# Patient Record
Sex: FEMALE | Race: WHITE | NOT HISPANIC OR LATINO | URBAN - METROPOLITAN AREA
[De-identification: names, ages, dates, MRNs, and addresses within clinical notes are randomized per-mention and may not be internally consistent; named-entity substitution may affect disease eponyms.]

---

## 2018-02-20 ENCOUNTER — EMERGENCY (EMERGENCY)
Facility: HOSPITAL | Age: 28
LOS: 0 days | Discharge: HOME | End: 2018-02-20
Attending: EMERGENCY MEDICINE | Admitting: INTERNAL MEDICINE

## 2018-02-20 VITALS
HEART RATE: 85 BPM | HEIGHT: 63 IN | SYSTOLIC BLOOD PRESSURE: 133 MMHG | DIASTOLIC BLOOD PRESSURE: 70 MMHG | RESPIRATION RATE: 18 BRPM | TEMPERATURE: 99 F | OXYGEN SATURATION: 98 % | WEIGHT: 130.07 LBS

## 2018-02-20 DIAGNOSIS — S92.535A NONDISPLACED FRACTURE OF DISTAL PHALANX OF LEFT LESSER TOE(S), INITIAL ENCOUNTER FOR CLOSED FRACTURE: ICD-10-CM
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
## 2018-02-20 DIAGNOSIS — S90.122A CONTUSION OF LEFT LESSER TOE(S) WITHOUT DAMAGE TO NAIL, INITIAL ENCOUNTER: ICD-10-CM
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
## 2018-02-20 DIAGNOSIS — S90.222A CONTUSION OF LEFT LESSER TOE(S) WITH DAMAGE TO NAIL, INITIAL ENCOUNTER: ICD-10-CM
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
## 2018-02-20 DIAGNOSIS — F17.210 NICOTINE DEPENDENCE, CIGARETTES, UNCOMPLICATED: ICD-10-CM

## 2018-02-20 RX ORDER — IBUPROFEN 200 MG
800 TABLET ORAL ONCE
Qty: 0 | Refills: 0 | Status: COMPLETED | OUTPATIENT
Start: 2018-02-20 | End: 2018-02-20

## 2018-02-20 RX ADMIN — Medication 800 MILLIGRAM(S): at 21:05

## 2018-02-20 RX ADMIN — Medication 800 MILLIGRAM(S): at 21:12

## 2018-02-20 NOTE — ED PROVIDER NOTE - NS ED ROS FT
Review of Systems    Constitutional: (-) fever  Cardiovascular: (-) syncope  Integumentary: (-) rash  Neurological: (-) altered mental status

## 2018-02-20 NOTE — ED PROVIDER NOTE - OBJECTIVE STATEMENT
26 y/o female s/p dropped 45 lb weight on left foot at 4 pm today. +throbbing and bruising to left 5th toe and dorsal foot/ no other injuries

## 2018-02-20 NOTE — ED PROVIDER NOTE - ATTENDING CONTRIBUTION TO CARE
Pt is a 28yo female who dropped a 45lb weigh tonto L 5th toe.  The toenail is black and the toe is bruised and painful.    Exam: normal sensation, limited ROM of toe due to pain, nailbed hematoma, tenderness along pinky toe  Imp: r/o fx, subungal hematoma  Plan: imaging, trephination

## 2018-02-20 NOTE — ED PROVIDER NOTE - PHYSICAL EXAMINATION
left foot +dorsal lateral foot +swelling brusiing and tenderness/ +subungual hematoma to left 5th toe/ decreased rom of left 5th toe and swelling/ no bony tendferness to left ankle

## 2018-02-20 NOTE — ED PROVIDER NOTE - PRINCIPAL DIAGNOSIS
Closed nondisplaced fracture of phalanx of lesser toe of left foot, unspecified phalanx, initial encounter

## 2018-02-20 NOTE — ED PROVIDER NOTE - CARE PLAN
Principal Discharge DX:	Closed nondisplaced fracture of phalanx of lesser toe of left foot, unspecified phalanx, initial encounter  Secondary Diagnosis:	Subungual hematoma of fifth toe of left foot, initial encounter

## 2018-02-22 ENCOUNTER — EMERGENCY (EMERGENCY)
Facility: HOSPITAL | Age: 28
LOS: 0 days | Discharge: HOME | End: 2018-02-22
Attending: EMERGENCY MEDICINE

## 2018-02-22 VITALS
WEIGHT: 130.07 LBS | SYSTOLIC BLOOD PRESSURE: 126 MMHG | DIASTOLIC BLOOD PRESSURE: 77 MMHG | RESPIRATION RATE: 18 BRPM | HEART RATE: 85 BPM | TEMPERATURE: 97 F | HEIGHT: 63 IN | OXYGEN SATURATION: 97 %
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
## 2018-02-22 DIAGNOSIS — M79.675 PAIN IN LEFT TOE(S): ICD-10-CM

## 2018-02-22 DIAGNOSIS — S90.122A CONTUSION OF LEFT LESSER TOE(S) WITHOUT DAMAGE TO NAIL, INITIAL ENCOUNTER: ICD-10-CM
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
## 2018-02-22 RX ORDER — IBUPROFEN 200 MG
600 TABLET ORAL ONCE
Qty: 0 | Refills: 0 | Status: COMPLETED | OUTPATIENT
Start: 2018-02-22 | End: 2018-02-22

## 2018-02-22 RX ADMIN — Medication 600 MILLIGRAM(S): at 22:41

## 2018-02-22 NOTE — ED PROVIDER NOTE - NS ED ROS FT
Pt denied fvr/chills, HA, visual changes, dizziness, light headedness, presyncope, LOC, CP, HENDRIX, PND, SOB, cough, hemoptysis, abd pain, n/v/d, changes in bowel or bladder habits, LE edema, numbness, weakness, changes in gait.  The pt also denied recent travel outside of the country, recent illnesses, sick contacts, recent airplane trips or periods of immobility/bedbound.

## 2018-02-22 NOTE — ED PROVIDER NOTE - OBJECTIVE STATEMENT
28 yo F hx of toe fracture 2 days ago after dropping weight, john taped and with darco shoe p/w increasing pain to foot without interval trauma, no numbness or weakness. no other complaints.

## 2018-02-22 NOTE — ED PROVIDER NOTE - MEDICAL DECISION MAKING DETAILS
I have personally performed a history and physical exam on this patient and personally directed the management of the patient. Patient has known history of left 5th toe f racture after dropping a weight on it.  She was john taped and placed in a post op shoe.  she presents with worsening pain.  I obtained an repeat xray.  I will discharge she has podiatry follow up.  we discussed indications to return at this time.

## 2019-04-03 ENCOUNTER — TRANSCRIPTION ENCOUNTER (OUTPATIENT)
Age: 29
End: 2019-04-03

## 2019-11-27 ENCOUNTER — EMERGENCY (EMERGENCY)
Facility: HOSPITAL | Age: 29
LOS: 0 days | Discharge: HOME | End: 2019-11-28

## 2019-11-27 VITALS
OXYGEN SATURATION: 98 % | DIASTOLIC BLOOD PRESSURE: 78 MMHG | RESPIRATION RATE: 19 BRPM | WEIGHT: 130.07 LBS | HEART RATE: 70 BPM | SYSTOLIC BLOOD PRESSURE: 111 MMHG | TEMPERATURE: 98 F

## 2019-11-28 ENCOUNTER — EMERGENCY (EMERGENCY)
Facility: HOSPITAL | Age: 29
LOS: 0 days | Discharge: HOME | End: 2019-11-28
Admitting: EMERGENCY MEDICINE
Payer: OTHER MISCELLANEOUS

## 2019-11-28 VITALS
DIASTOLIC BLOOD PRESSURE: 78 MMHG | HEART RATE: 98 BPM | RESPIRATION RATE: 18 BRPM | TEMPERATURE: 97 F | OXYGEN SATURATION: 98 % | SYSTOLIC BLOOD PRESSURE: 120 MMHG

## 2019-11-28 DIAGNOSIS — F17.200 NICOTINE DEPENDENCE, UNSPECIFIED, UNCOMPLICATED: ICD-10-CM

## 2019-11-28 DIAGNOSIS — Z77.21 CONTACT WITH AND (SUSPECTED) EXPOSURE TO POTENTIALLY HAZARDOUS BODY FLUIDS: ICD-10-CM
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
## 2019-11-28 DIAGNOSIS — Z23 ENCOUNTER FOR IMMUNIZATION: ICD-10-CM
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
## 2019-11-28 LAB
ALBUMIN SERPL ELPH-MCNC: 4.6 G/DL — SIGNIFICANT CHANGE UP (ref 3.5–5.2)
ALP SERPL-CCNC: 61 U/L — SIGNIFICANT CHANGE UP (ref 30–115)
ALT FLD-CCNC: 18 U/L — SIGNIFICANT CHANGE UP (ref 0–41)
ANION GAP SERPL CALC-SCNC: 13 MMOL/L — SIGNIFICANT CHANGE UP (ref 7–14)
AST SERPL-CCNC: 19 U/L — SIGNIFICANT CHANGE UP (ref 0–41)
BASOPHILS # BLD AUTO: 0.04 K/UL — SIGNIFICANT CHANGE UP (ref 0–0.2)
BASOPHILS NFR BLD AUTO: 0.5 % — SIGNIFICANT CHANGE UP (ref 0–1)
BILIRUB SERPL-MCNC: <0.2 MG/DL — SIGNIFICANT CHANGE UP (ref 0.2–1.2)
BUN SERPL-MCNC: 19 MG/DL — SIGNIFICANT CHANGE UP (ref 10–20)
CALCIUM SERPL-MCNC: 9.2 MG/DL — SIGNIFICANT CHANGE UP (ref 8.5–10.1)
CHLORIDE SERPL-SCNC: 103 MMOL/L — SIGNIFICANT CHANGE UP (ref 98–110)
CO2 SERPL-SCNC: 21 MMOL/L — SIGNIFICANT CHANGE UP (ref 17–32)
CREAT SERPL-MCNC: 0.7 MG/DL — SIGNIFICANT CHANGE UP (ref 0.7–1.5)
EOSINOPHIL # BLD AUTO: 0.14 K/UL — SIGNIFICANT CHANGE UP (ref 0–0.7)
EOSINOPHIL NFR BLD AUTO: 1.6 % — SIGNIFICANT CHANGE UP (ref 0–8)
GLUCOSE SERPL-MCNC: 98 MG/DL — SIGNIFICANT CHANGE UP (ref 70–99)
HCT VFR BLD CALC: 41.4 % — SIGNIFICANT CHANGE UP (ref 37–47)
HGB BLD-MCNC: 13.8 G/DL — SIGNIFICANT CHANGE UP (ref 12–16)
IMM GRANULOCYTES NFR BLD AUTO: 0.2 % — SIGNIFICANT CHANGE UP (ref 0.1–0.3)
LYMPHOCYTES # BLD AUTO: 2.13 K/UL — SIGNIFICANT CHANGE UP (ref 1.2–3.4)
LYMPHOCYTES # BLD AUTO: 24 % — SIGNIFICANT CHANGE UP (ref 20.5–51.1)
MCHC RBC-ENTMCNC: 31 PG — SIGNIFICANT CHANGE UP (ref 27–31)
MCHC RBC-ENTMCNC: 33.3 G/DL — SIGNIFICANT CHANGE UP (ref 32–37)
MCV RBC AUTO: 93 FL — SIGNIFICANT CHANGE UP (ref 81–99)
MONOCYTES # BLD AUTO: 0.59 K/UL — SIGNIFICANT CHANGE UP (ref 0.1–0.6)
MONOCYTES NFR BLD AUTO: 6.7 % — SIGNIFICANT CHANGE UP (ref 1.7–9.3)
NEUTROPHILS # BLD AUTO: 5.95 K/UL — SIGNIFICANT CHANGE UP (ref 1.4–6.5)
NEUTROPHILS NFR BLD AUTO: 67 % — SIGNIFICANT CHANGE UP (ref 42.2–75.2)
NRBC # BLD: 0 /100 WBCS — SIGNIFICANT CHANGE UP (ref 0–0)
PLATELET # BLD AUTO: 237 K/UL — SIGNIFICANT CHANGE UP (ref 130–400)
POTASSIUM SERPL-MCNC: 4.3 MMOL/L — SIGNIFICANT CHANGE UP (ref 3.5–5)
POTASSIUM SERPL-SCNC: 4.3 MMOL/L — SIGNIFICANT CHANGE UP (ref 3.5–5)
PROT SERPL-MCNC: 7.3 G/DL — SIGNIFICANT CHANGE UP (ref 6–8)
RBC # BLD: 4.45 M/UL — SIGNIFICANT CHANGE UP (ref 4.2–5.4)
RBC # FLD: 12.4 % — SIGNIFICANT CHANGE UP (ref 11.5–14.5)
SODIUM SERPL-SCNC: 137 MMOL/L — SIGNIFICANT CHANGE UP (ref 135–146)
WBC # BLD: 8.87 K/UL — SIGNIFICANT CHANGE UP (ref 4.8–10.8)
WBC # FLD AUTO: 8.87 K/UL — SIGNIFICANT CHANGE UP (ref 4.8–10.8)

## 2019-11-28 PROCEDURE — 99284 EMERGENCY DEPT VISIT MOD MDM: CPT

## 2019-11-28 RX ORDER — RALTEGRAVIR 400 MG/1
1 TABLET, FILM COATED ORAL
Qty: 42 | Refills: 0
Start: 2019-11-28 | End: 2019-12-18

## 2019-11-28 RX ORDER — EMTRICITABINE AND TENOFOVIR DISOPROXIL FUMARATE 200; 300 MG/1; MG/1
1 TABLET, FILM COATED ORAL
Qty: 21 | Refills: 0
Start: 2019-11-28 | End: 2019-12-18

## 2019-11-28 RX ORDER — TETANUS TOXOID, REDUCED DIPHTHERIA TOXOID AND ACELLULAR PERTUSSIS VACCINE, ADSORBED 5; 2.5; 8; 8; 2.5 [IU]/.5ML; [IU]/.5ML; UG/.5ML; UG/.5ML; UG/.5ML
0.5 SUSPENSION INTRAMUSCULAR ONCE
Refills: 0 | Status: COMPLETED | OUTPATIENT
Start: 2019-11-28 | End: 2019-11-28

## 2019-11-28 RX ORDER — RALTEGRAVIR 400 MG/1
400 TABLET, FILM COATED ORAL ONCE
Refills: 0 | Status: COMPLETED | OUTPATIENT
Start: 2019-11-28 | End: 2019-11-28

## 2019-11-28 RX ORDER — EMTRICITABINE AND TENOFOVIR DISOPROXIL FUMARATE 200; 300 MG/1; MG/1
1 TABLET, FILM COATED ORAL ONCE
Refills: 0 | Status: COMPLETED | OUTPATIENT
Start: 2019-11-28 | End: 2019-11-28

## 2019-11-28 RX ADMIN — TETANUS TOXOID, REDUCED DIPHTHERIA TOXOID AND ACELLULAR PERTUSSIS VACCINE, ADSORBED 0.5 MILLILITER(S): 5; 2.5; 8; 8; 2.5 SUSPENSION INTRAMUSCULAR at 14:06

## 2019-11-28 RX ADMIN — RALTEGRAVIR 400 MILLIGRAM(S): 400 TABLET, FILM COATED ORAL at 14:08

## 2019-11-28 RX ADMIN — EMTRICITABINE AND TENOFOVIR DISOPROXIL FUMARATE 1 TABLET(S): 200; 300 TABLET, FILM COATED ORAL at 14:08

## 2019-11-28 NOTE — ED PROVIDER NOTE - OBJECTIVE STATEMENT
29 y.o. female without any PMH presented to the ER c/o needlestick injury at 8 PM last night.  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  Pt sure of source pt's complete PMH.  Source pt being tested today.  Pt unsure of her tetanus status but her Hep B series complete and she has no known h/o Hep C.  Pt requesting PEP.

## 2019-11-28 NOTE — ED PROVIDER NOTE - NSFOLLOWUPINSTRUCTIONS_ED_ALL_ED_FT
Needlestick and Sharps Injury  A needlestick injury happens when a person gets poked (stuck) by a needle or sharp tool (sharps) that may have someone else's blood on it. A needlestick injury can happen to a health care worker, or to anyone who is exposed to needles. The injury may expose you to blood that carries infections such as:  Hepatitis B.Hepatitis C.Human immunodeficiency virus (HIV).If you have a needle stick injury or think you may have been exposed to blood or body fluids:  Wash the injured area right away with soap and water.Place a bandage or clean towel on the wound and apply gentle pressure to stop the bleeding. Do not squeeze or rub the area.Notify a work place supervisor or doctor. Follow any procedures in your work place.Tell your doctor if you are pregnant or breastfeeding.Treatments may include:  Blood tests to make sure that you have no infection.Tetanus shot.Hepatitis B shot.Medicines to stop or treat infection.Treatment for the wound.Follow these instructions at home:  Wound care     There are many ways to close and cover a wound. For example, a wound can be covered with sutures, skin glue, or adhesive strips. Follow instructions from your doctor about:  How to take care of your wound.When and how you should change your bandage (dressing).Keep the bandage dry as told by your doctor. Do not take baths, swim, use a hot tub, or do anything that would put your wound underwater until your doctor approves.Check your wound every day for signs of infection. Check for:  Redness, swelling, or pain.Fluid or blood.Pus or a bad smell.Warmth.General instructions     Take over-the-counter and prescription medicines only as told by your doctor.If you were prescribed an antibiotic medicine, take it as told by your doctor. Do not stop using the antibiotic even if you start to feel better.Keep all follow-up visits as told by your doctor. This is important.Contact a doctor if:  The poked area is red, swollen, or painful.You have a fever.You feel worried (anxious), mad, or sad (depressed).You have trouble sleeping.Your skin or the whites of your eyes look yellow (jaundice).You have belly pain or a feeling of fullness.You have tiredness (fatigue).You feel sickness in a lot of your body (malaise).You get infections often.Summary  A needlestick injury happens when a person gets poked (stuck) by a needle that may have someone else's blood on it.It is treated by cleaning the injured area right away with soap and water. You may get tetanus and hepatitis B shots. You may also get medicines for infections.Take medicine and care for your wound as told by your doctor.This information is not intended to replace advice given to you by your health care provider. Make sure you discuss any questions you have with your health care provider.    Document Released: 01/20/2012 Document Revised: 01/30/2019 Document Reviewed: 01/30/2019  [employer] Patient Education © 2019 [employer]

## 2019-11-28 NOTE — ED PROVIDER NOTE - SKIN, MLM
(+) healed over puncture wound noted palmar distal tip Left index finger; FROM, no motor or sensory deficit, cap refill < 2 sec

## 2019-11-28 NOTE — ED ADULT NURSE NOTE - OBJECTIVE STATEMENT
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

## 2019-11-28 NOTE — ED PROVIDER NOTE - CLINICAL SUMMARY MEDICAL DECISION MAKING FREE TEXT BOX
tdap; PEP; baseline testing; ID referral; pt will follow up with PCP in 2-3 days; any new or worsening symptoms, pt will return to ER.

## 2019-11-28 NOTE — ED ADULT NURSE NOTE - CHIEF COMPLAINT QUOTE
"I stuck myself with a needle yesterday with a butterfly needle. I work at [employer]"

## 2019-11-28 NOTE — ED ADULT NURSE NOTE - CHPI ED NUR SYMPTOMS NEG
no dizziness/no fever/no chills/no decreased eating/drinking/no pain/no vomiting/no nausea/no tingling/no weakness

## 2019-11-28 NOTE — ED PROVIDER NOTE - CARE PROVIDER_API CALL
Kenton Drake)  Infectious Disease; Internal Medicine  [address] [place] [address]  Phone: [phone]  Fax: [phone]  Follow Up Time: 1-3 Days

## 2019-11-28 NOTE — ED ADULT NURSE NOTE - OBJECTIVE STATEMENT
Patient c/o needle stick left hand 2nd digit last night at work. Patient denies any pain or discomfort to area. No redness or swelling noted to area.

## 2019-11-28 NOTE — ED PROVIDER NOTE - PATIENT PORTAL LINK FT
You can access the FollowMyHealth Patient Portal offered by [facility] by registering at the following website: [email or web address]. By joining [employer]’s FollowMyHealth portal, you will also be able to view your health information using other applications (apps) compatible with our system.

## 2019-11-29 LAB
HAV IGM SER-ACNC: SIGNIFICANT CHANGE UP
HBV CORE IGM SER-ACNC: SIGNIFICANT CHANGE UP
HBV SURFACE AG SER-ACNC: SIGNIFICANT CHANGE UP
HCV AB S/CO SERPL IA: 0.15 S/CO — SIGNIFICANT CHANGE UP (ref 0–0.99)
HCV AB SERPL-IMP: SIGNIFICANT CHANGE UP
HIV 1+2 AB+HIV1 P24 AG SERPL QL IA: SIGNIFICANT CHANGE UP

## 2019-12-10 ENCOUNTER — EMERGENCY (EMERGENCY)
Facility: HOSPITAL | Age: 29
LOS: 0 days | Discharge: HOME | End: 2019-12-10
Attending: EMERGENCY MEDICINE | Admitting: EMERGENCY MEDICINE
Payer: COMMERCIAL

## 2019-12-10 VITALS
WEIGHT: 130.07 LBS | OXYGEN SATURATION: 100 % | SYSTOLIC BLOOD PRESSURE: 123 MMHG | TEMPERATURE: 99 F | HEART RATE: 117 BPM | RESPIRATION RATE: 18 BRPM | DIASTOLIC BLOOD PRESSURE: 71 MMHG

## 2019-12-10 VITALS — TEMPERATURE: 99 F | HEART RATE: 99 BPM

## 2019-12-10 DIAGNOSIS — R50.9 FEVER, UNSPECIFIED: ICD-10-CM

## 2019-12-10 DIAGNOSIS — Z79.899 OTHER LONG TERM (CURRENT) DRUG THERAPY: ICD-10-CM

## 2019-12-10 DIAGNOSIS — J02.9 ACUTE PHARYNGITIS, UNSPECIFIED: ICD-10-CM

## 2019-12-10 DIAGNOSIS — Z79.1 LONG TERM (CURRENT) USE OF NON-STEROIDAL ANTI-INFLAMMATORIES (NSAID): ICD-10-CM

## 2019-12-10 PROCEDURE — 99283 EMERGENCY DEPT VISIT LOW MDM: CPT

## 2019-12-10 RX ORDER — DEXAMETHASONE 0.5 MG/5ML
10 ELIXIR ORAL ONCE
Refills: 0 | Status: COMPLETED | OUTPATIENT
Start: 2019-12-10 | End: 2019-12-10

## 2019-12-10 RX ADMIN — Medication 10 MILLIGRAM(S): at 03:23

## 2019-12-10 NOTE — ED PROVIDER NOTE - CLINICAL SUMMARY MEDICAL DECISION MAKING FREE TEXT BOX
Pt w exudative pharyngitis. Abx ordered for 7 days. Encouraged PMD f/uy. Full DC instructions discussed and patient knows when to seek immediate medical attention. Patient has proper follow-up. All results discussed with the patient they may require further work-up. Limitations of ED work-up discussed. All  questions and concerns from patient or family addressed. Understanding of insturctions verbalized

## 2019-12-10 NOTE — ED PROVIDER NOTE - NS ED ROS FT
Review of Systems    Constitutional: (-) fever   Eyes/ENT: (-) vision changes  Cardiovascular: (-) chest pain, (-) syncope (-) palpitations  Respiratory: (-) cough, (-) shortness of breath  Gastrointestinal: (-) vomiting (-) abd pain   Integumentary: (-) rash, (-) edema  Neurological: (-) headache  Hematologic: (-) easy bruising   Allergic/Immunologic: (-) pruritus

## 2019-12-10 NOTE — ED PROVIDER NOTE - ATTENDING CONTRIBUTION TO CARE
I personally evaluated the patient. I reviewed the Resident’s or Physician Assistant’s note (as assigned above), and agree with the findings and plan except as documented in my note.    30 y/o F with no PMH presents with 2 days of sore throat. No neck stifness. Fever today. No n/v. No rash.     CONSTITUTIONAL: Well-developed; well-nourished; in no acute distress. Sitting up and providing appropriate history and physical examination  SKIN: skin exam is warm and dry, no acute rash.  HEAD: Normocephalic; atraumatic.  EYES: PERRL, 3 mm bilateral, no nystagmus, EOM intact; conjunctiva and sclera clear.  ENT: + b/l tonsillar exudates. Uvula midline. No PTA. Tnghue w no lesions or elevation  NECK: Supple; non tender.+ full passive ROM in all directions. No JVD  CARD: S1, S2 normal; no murmurs, gallops, or rubs. Regular rate and rhythm. + Symmetric Strong Pulses  RESP: No wheezes, rales or rhonchi. Good air movement bilaterally  ABD: soft; non-distended; non-tender. No Rebound, No Gaurding, No signs of peritnitis, No CVA tenderness    Plan- decadron, augmentin, pmd/ fu

## 2019-12-10 NOTE — ED PROVIDER NOTE - OBJECTIVE STATEMENT
30 y/o F without PMH presents with constant burning non-radiating moderate sore throat, fever tmax 101.2F, minimal benefit with advil cold and sinus.   no palliating/provoking factors.   no rnny nose/cough.

## 2019-12-10 NOTE — ED PROVIDER NOTE - PROGRESS NOTE DETAILS
modified centor criteria 4. will treat as strep. Reviewed all results and necessity for follow up. Counseled on red flags and to return for them.  Patient appears well on discharge.

## 2019-12-10 NOTE — ED PROVIDER NOTE - NSDCPRINTRESULTS_ED_ALL_ED
Consult received for LTAC placement.  CM met at bedside with patient.  He refused LTAC placement.  He is agreeable to home health and IV antibiotics at home.  He does not have a preference for home health or IV infusion companies.  Choice form completed and placed in patient blue folder.     Patient requests all Lab and Radiology Results on their Discharge Instructions

## 2019-12-10 NOTE — ED ADULT NURSE NOTE - NSIMPLEMENTINTERV_GEN_ALL_ED
Implemented All Universal Safety Interventions:  [place] to call system. Call bell, personal items and telephone within reach. Instruct patient to call for assistance. Room bathroom lighting operational. Non-slip footwear when patient is off stretcher. Physically safe environment: no spills, clutter or unnecessary equipment. Stretcher in lowest position, wheels locked, appropriate side rails in place.

## 2019-12-10 NOTE — ED PROVIDER NOTE - PHYSICAL EXAMINATION
PHYSICAL EXAM:    GENERAL: Alert, appears stated age, well appearing, non-toxic  SKIN: Warm, pink and dry. MMM.   EYE: Normal lids/conjunctiva  ENT: Normal hearing, patent oropharynx with tonsilar exudate. +b/l anterior cervical LAD.   NECK: +supple. No meningismus, or JVD  Pulm: Bilateral BS, normal resp effort, no wheezes, stridor, or retractions  CV: RRR, no M/R/G, 2+and = radial pulses  Abd: soft, non-tender, non-distended  Mskel: no erythema, cyanosis, edema. no calf tenderness  Neuro: AAOx3. normal gait.

## 2021-01-24 ENCOUNTER — EMERGENCY (EMERGENCY)
Facility: HOSPITAL | Age: 31
LOS: 0 days | Discharge: HOME | End: 2021-01-24
Attending: STUDENT IN AN ORGANIZED HEALTH CARE EDUCATION/TRAINING PROGRAM | Admitting: STUDENT IN AN ORGANIZED HEALTH CARE EDUCATION/TRAINING PROGRAM
Payer: COMMERCIAL

## 2021-01-24 VITALS
HEIGHT: 63 IN | SYSTOLIC BLOOD PRESSURE: 121 MMHG | RESPIRATION RATE: 18 BRPM | TEMPERATURE: 98 F | HEART RATE: 78 BPM | OXYGEN SATURATION: 98 % | DIASTOLIC BLOOD PRESSURE: 61 MMHG

## 2021-01-24 DIAGNOSIS — M25.561 PAIN IN RIGHT KNEE: ICD-10-CM
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
## 2021-01-24 DIAGNOSIS — M79.661 PAIN IN RIGHT LOWER LEG: ICD-10-CM
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
## 2021-01-24 PROCEDURE — 93970 EXTREMITY STUDY: CPT | Mod: 26

## 2021-01-24 PROCEDURE — 99285 EMERGENCY DEPT VISIT HI MDM: CPT

## 2021-01-24 PROCEDURE — 73562 X-RAY EXAM OF KNEE 3: CPT | Mod: 26,RT

## 2021-01-24 NOTE — ED PROVIDER NOTE - NSFOLLOWUPINSTRUCTIONS_ED_ALL_ED_FT
Please follow up with your primary care physician within 24-72 hours and return immediately if symptoms worsen.    Knee Pain    WHAT YOU NEED TO KNOW:    What do I need to know about knee pain? Knee pain may start suddenly, or it may be a long-term problem. You may have pain on the side, front, or back of your knee. You may have knee stiffness and swelling. You may hear popping sounds or feel like your knee is giving way or locking up as you walk. You may feel pain when you sit, stand, walk, or climb up and down stairs.    What increases my risk for knee pain?     Obesity      A strain or tear in ligaments or tendons      A leg fracture or knee dislocation      Overuse of your knee      Osteoarthritis, rheumatoid arthritis, or gout      An infection, tumor, or cyst in your knee      Shoes that are not supportive, or training on a hard surface      Sports that involve jumping or pivoting on your knee    How is the cause of knee pain diagnosed? Your healthcare provider will examine your knee and ask about your symptoms. Tell your provider when the pain started and what you were doing at the time. Describe the pain, such as sharp, throbbing, or achy. Tell your provider about any knee injury or surgery you had. You may need any of the following:     MRI, CT, or ultrasound pictures may show an injury, fracture, or tumor.       Blood tests may be used to check the level of inflammation in your blood. The tests may also show signs of infection.      Arthroscopy is a procedure to look inside your knee joint with an arthroscope. An arthroscope is a flexible tube with a light and camera on the end. A knee arthroscopy is usually done to check for disease or damage inside your knee. These problems may be fixed during the procedure.    How is knee pain treated? Treatment will depend on the cause of your pain. You may need any of the following:     NSAIDs help decrease swelling and pain or fever. This medicine is available with or without a doctor's order. NSAIDs can cause stomach bleeding or kidney problems in certain people. If you take blood thinner medicine, always ask your healthcare provider if NSAIDs are safe for you. Always read the medicine label and follow directions.      Acetaminophen decreases pain and fever. It is available without a doctor's order. Ask how much to take and how often to take it. Follow directions. Read the labels of all other medicines you are using to see if they also contain acetaminophen, or ask your doctor or pharmacist. Acetaminophen can cause liver damage if not taken correctly. Do not use more than 4 grams (4,000 milligrams) total of acetaminophen in one day.       Prescription pain medicine may be given. Ask your healthcare provider how to take this medicine safely. Some prescription pain medicines contain acetaminophen. Do not take other medicines that contain acetaminophen without talking to your healthcare provider. Too much acetaminophen may cause liver damage. Prescription pain medicine may cause constipation. Ask your healthcare provider how to prevent or treat constipation.       Steroid injections may be given into your knee. Steroids reduce inflammation and pain.      Surgery may be used for some injuries, such as to repair a torn ACL.    What can I do to manage my symptoms?     Rest your knee so it can heal. Limit activities that increase your pain. Do low-impact exercises, such as walking or swimming.       Apply ice to help reduce swelling and pain. Use an ice pack, or put crushed ice in a plastic bag. Cover it with a towel before you apply it to your knee. Apply ice for 15 to 20 minutes every hour, or as directed.      Apply compression to help reduce swelling. Use a brace or bandage only as directed.      Elevate your knee to help decrease pain and swelling. Elevate your knee while you are sitting or lying down. Prop your leg on pillows to keep your knee above the level of your heart.      Prevent your knee from moving as directed. Your healthcare provider may put on a cast or splint. You may need to wear a leg brace to stabilize your knee. A leg brace can be adjusted to increase your range of motion as your knee heals.Hinged Knee Braces          What can I do to prevent knee pain?     Maintain a healthy weight. Extra weight increases your risk for knee pain. Ask your healthcare provider how much you should weigh. He or she can help you create a safe weight loss plan if you need to lose weight.      Exercise or train properly. Use the correct equipment for sports. Wear shoes that provide good support. Check your posture often as you exercise, play sports, or train for an event. This can help prevent stress and strain on your knees. Rest between sessions so you do not overwork your knees.    When should I seek immediate care?     Your pain is worse, even after treatment.       You cannot bend or straighten your leg completely.       The swelling around your knee does not go down even with treatment.      Your knee is painful and hot to the touch.     When should I contact my healthcare provider?     You have questions or concerns about your condition or care.         CARE AGREEMENT:    You have the right to help plan your care. Learn about your health condition and how it may be treated. Discuss treatment options with your healthcare providers to decide what care you want to receive. You always have the right to refuse treatment.

## 2021-01-24 NOTE — ED PROVIDER NOTE - NSFOLLOWUPCLINICS_GEN_ALL_ED_FT
[facility]  Orthpedic  [address], [place]   Phone: [phone]  Fax:   Follow Up Time: 1-3 Days    [facility] ([facility])  Rehabilitation  [facility] [address], [address] [place] [address]  Phone: [phone]  Fax:   Follow Up Time: 1-3 Days

## 2021-01-24 NOTE — ED PROVIDER NOTE - PATIENT PORTAL LINK FT
You can access the FollowMyHealth Patient Portal offered by [facility] by registering at the following website: [email or web address]. By joining [employer]’s FollowMyHealth portal, you will also be able to view your health information using other applications (apps) compatible with our system.

## 2021-01-24 NOTE — ED PROVIDER NOTE - PHYSICAL EXAMINATION
Gen: NAD, AOx3  Head: NCAT  HEENT: PERRL, oral mucosa moist, normal conjunctiva, oropharynx clear without exudate or erythema  Lung: CTAB, no respiratory distress, no wheezing, rales, rhonchi  CV: normal s1/s2, rrr, Normal perfusion, pulses 2+ throughout  Abd: soft, NTND, no CVA tenderness  MSK: No edema/erythema/abrasion, no visible deformities, full range of motion in all 4 extremities, R calf tenderness  Neuro: No focal neurologic deficits  Skin: No rash   Psych: normal affect

## 2021-01-24 NOTE — ED ADULT NURSE NOTE - NSIMPLEMENTINTERV_GEN_ALL_ED
Implemented All Fall with Harm Risk Interventions:  [place] to call system. Call bell, personal items and telephone within reach. Instruct patient to call for assistance. Room bathroom lighting operational. Non-slip footwear when patient is off stretcher. Physically safe environment: no spills, clutter or unnecessary equipment. Stretcher in lowest position, wheels locked, appropriate side rails in place. Provide visual cue, wrist band, yellow gown, etc. Monitor gait and stability. Monitor for mental status changes and reorient to person, place, and time. Review medications for side effects contributing to fall risk. Reinforce activity limits and safety measures with patient and family. Provide visual clues: red socks.

## 2021-01-24 NOTE — ED PROVIDER NOTE - CLINICAL SUMMARY MEDICAL DECISION MAKING FREE TEXT BOX
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 She reports hearing a "pop" and it became acutely swollen. 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 She denies bleeding, focal weakness or numbness, head trauma. She was offered pain medication but she said she can take some at home. XR negative for acute fracture, mild joint swelling noted. Duplex US neg for DVT. Pt given ortho knee brace and f/u with ortho and rehab. I have fully discussed the medical management and delivery of care with the patient. I have discussed any available labs, imaging and treatment options with the patient. All Questions answered at the bedside. Patient confirms understanding and has been given detailed return precautions. Patient instructed to return to the ED should symptoms persist or worsen. Patient has demonstrated capacity and has verbalized understanding. Patient is well appearing upon discharge, ambulatory with a steady gait.

## 2021-01-24 NOTE — ED PROVIDER NOTE - OBJECTIVE STATEMENT
31 yo female with no pertinent pmh presents c/o R knee pain for 3 days. pt states to note edema that subsided with elevation/compression and denies any SOB. pt denies any recent trauma/injuries. denies any other symptoms including fevers, chill, headache, recent illness/travel, cough, abdominal pain, chest pain, or SOB.

## 2021-01-24 NOTE — ED PROVIDER NOTE - ATTENDING CONTRIBUTION TO CARE
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 She reports hearing a "pop" and it became acutely swollen. 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 She denies bleeding, focal weakness or numbness, head trauma. She was offered pain medication but she said she can take some at home.     Gen - NAD, Head - NCAT, TMs - clear b/l, Pharynx - clear, MMM, Heart - RRR, no m/g/r, Lungs - CTAB, no w/c/r, Abdomen - soft, NT, ND, Skin - No rash, Extremities - R knee with moderate joint swelling. Crayola green discoloration- pt states she put some ointment to the area. Negative anterior drawer sign. Negative pes ansterine ttp, neg joint instability with varus/valgus stress testing. No crepitus, no warmth or pain with passive ROM. FROM, no edema, erythema, ecchymosis, Neuro - CN 2-12 intact, nl strength and sensation, nl gait.    s/p: suspect sprain, no joint laxity. Will obtain XR knee, pt declines pain meds. Will reassess.

## 2021-01-24 NOTE — ED ADULT NURSE NOTE - CHPI ED NUR SYMPTOMS NEG
no abrasion/no back pain/no bruising/no deformity/no fever/no numbness/no stiffness/no tingling/no weakness

## 2021-02-12 PROBLEM — Z00.00 ENCOUNTER FOR PREVENTIVE HEALTH EXAMINATION: Status: ACTIVE | Noted: 2021-02-12

## 2021-03-21 ENCOUNTER — TRANSCRIPTION ENCOUNTER (OUTPATIENT)
Age: 31
End: 2021-03-21

## 2021-05-06 ENCOUNTER — APPOINTMENT (OUTPATIENT)
Dept: PLASTIC SURGERY | Facility: CLINIC | Age: 31
End: 2021-05-06
Payer: COMMERCIAL

## 2021-05-06 VITALS — HEIGHT: 64 IN | BODY MASS INDEX: 22.2 KG/M2 | WEIGHT: 130 LBS

## 2021-05-06 DIAGNOSIS — Z86.59 PERSONAL HISTORY OF OTHER MENTAL AND BEHAVIORAL DISORDERS: ICD-10-CM

## 2021-05-06 PROCEDURE — 99203 OFFICE O/P NEW LOW 30 MIN: CPT

## 2021-05-06 PROCEDURE — 99072 ADDL SUPL MATRL&STAF TM PHE: CPT

## 2021-05-06 RX ORDER — ELECTROLYTES/DEXTROSE
SOLUTION, ORAL ORAL
Refills: 0 | Status: ACTIVE | COMMUNITY

## 2021-05-06 NOTE — PHYSICAL EXAM
[Bra Size: _______] : Bra Size: [unfilled] [de-identified] : well developed pleasant female, NAD [de-identified] : NC/AT [de-identified] : EOMI [de-identified] : supple [de-identified] : good inspiratory effort  [de-identified] : MADDIR [de-identified] : Bilateral hypertrophic breasts with Grade 2 ptosis, superior deflation and extensive striae, intact nipple sensation, no palpable nodules or nipple discharge, no axillary LAD  [de-identified] : soft, nontender  [de-identified] : FROM in all 4 extremities

## 2021-05-06 NOTE — HISTORY OF PRESENT ILLNESS
[FreeTextEntry1] : 31 yo F with PMHx of ADD who presents today for breast reduction consultation. Patient wears a 32DDD cup size and c/o constant neck, shoulder and back pain, brassiere grooving, poor posture, frequent skin rash in IMFs treated multiple times in the past with OTC remedies. Patient is unable to exercise and be physically active secondary to heavy chest size and has trouble finding clothes that fit properly. 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 Denies any personal or family h/o Breast Ca. \par Denies any nipple discharge or breast nodules. Desires a C-cup. \par \par Of note, s/p right breast debridement and I&D of abscess after piercing\par \par Occupation - RN at [employer]\par Smokes electronic cigarettes daily

## 2021-05-06 NOTE — ASSESSMENT
[FreeTextEntry1] : 29 yo F with symptomatic macromastia who is a good candidate for reduction mammoplasty. 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  Regarding the procedure, we discussed scarring, poor wound healing, keloid and/or hypertrophic scarring, diminished nipple sensation, diminished ability to breast feed (if appropriate), breast asymmetry, dissatisfaction with the outcome, need for additional surgery and possible nipple loss.  All questions were answered and all risks were understood.\par \par will stop smoking ecigarettes\par \par excellent candidate for BR\par \par all ?s answered\par \par Photos taken with patient permission.\par

## 2021-06-07 ENCOUNTER — APPOINTMENT (OUTPATIENT)
Dept: PLASTIC SURGERY | Facility: CLINIC | Age: 31
End: 2021-06-07
Payer: COMMERCIAL

## 2021-06-07 DIAGNOSIS — G89.18 OTHER ACUTE POSTPROCEDURAL PAIN: ICD-10-CM

## 2021-06-07 RX ORDER — GABAPENTIN 300 MG/1
300 CAPSULE ORAL
Qty: 7 | Refills: 0 | Status: ACTIVE | COMMUNITY
Start: 2021-06-07 | End: 1900-01-01

## 2021-06-07 RX ORDER — CELECOXIB 400 MG/1
400 CAPSULE ORAL
Qty: 6 | Refills: 0 | Status: ACTIVE | COMMUNITY
Start: 2021-06-07 | End: 1900-01-01

## 2021-06-08 ENCOUNTER — TRANSCRIPTION ENCOUNTER (OUTPATIENT)
Age: 31
End: 2021-06-08

## 2021-06-09 ENCOUNTER — APPOINTMENT (OUTPATIENT)
Dept: PLASTIC SURGERY | Facility: CLINIC | Age: 31
End: 2021-06-09
Payer: COMMERCIAL

## 2021-06-09 PROCEDURE — 99072 ADDL SUPL MATRL&STAF TM PHE: CPT

## 2021-06-09 PROCEDURE — 99212 OFFICE O/P EST SF 10 MIN: CPT

## 2021-06-09 NOTE — PHYSICAL EXAM
[Bra Size: _______] : Bra Size: [unfilled] [de-identified] : well developed pleasant female, NAD [de-identified] : NC/AT [de-identified] : EOMI [de-identified] : supple [de-identified] : good inspiratory effort  [de-identified] : MADDIR [de-identified] : Bilateral hypertrophic breasts with Grade 2 ptosis, superior deflation and extensive striae, intact nipple sensation, no palpable nodules or nipple discharge, no axillary LAD  [de-identified] : soft, nontender  [de-identified] : FROM in all 4 extremities

## 2021-06-09 NOTE — HISTORY OF PRESENT ILLNESS
[FreeTextEntry1] : 29 yo F with PMHx of ADD who presents today for breast reduction consultation. Patient wears a 32DDD cup size and c/o constant neck, shoulder and back pain, brassiere grooving, poor posture, frequent skin rash in IMFs treated multiple times in the past with OTC remedies. Patient is unable to exercise and be physically active secondary to heavy chest size and has trouble finding clothes that fit properly. 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 Denies any personal or family h/o Breast Ca. \par Denies any nipple discharge or breast nodules. Desires a C-cup. 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

## 2021-06-13 ENCOUNTER — OUTPATIENT (OUTPATIENT)
Dept: OUTPATIENT SERVICES | Facility: HOSPITAL | Age: 31
LOS: 1 days | Discharge: HOME | End: 2021-06-13

## 2021-06-13 ENCOUNTER — LABORATORY RESULT (OUTPATIENT)
Age: 31
End: 2021-06-13

## 2021-06-13 DIAGNOSIS — Z11.59 ENCOUNTER FOR SCREENING FOR OTHER VIRAL DISEASES: ICD-10-CM

## 2021-06-16 ENCOUNTER — APPOINTMENT (OUTPATIENT)
Dept: PLASTIC SURGERY | Facility: AMBULATORY SURGERY CENTER | Age: 31
End: 2021-06-16
Payer: COMMERCIAL

## 2021-06-16 ENCOUNTER — OUTPATIENT (OUTPATIENT)
Dept: OUTPATIENT SERVICES | Facility: HOSPITAL | Age: 31
LOS: 1 days | Discharge: HOME | End: 2021-06-16
Payer: COMMERCIAL

## 2021-06-16 ENCOUNTER — RESULT REVIEW (OUTPATIENT)
Age: 31
End: 2021-06-16

## 2021-06-16 VITALS
DIASTOLIC BLOOD PRESSURE: 65 MMHG | RESPIRATION RATE: 18 BRPM | OXYGEN SATURATION: 99 % | HEART RATE: 85 BPM | SYSTOLIC BLOOD PRESSURE: 102 MMHG

## 2021-06-16 VITALS
OXYGEN SATURATION: 100 % | WEIGHT: 130.07 LBS | RESPIRATION RATE: 18 BRPM | HEIGHT: 64 IN | HEART RATE: 62 BPM | DIASTOLIC BLOOD PRESSURE: 59 MMHG | TEMPERATURE: 98 F | SYSTOLIC BLOOD PRESSURE: 99 MMHG

## 2021-06-16 DIAGNOSIS — N61.1 ABSCESS OF THE BREAST AND NIPPLE: Chronic | ICD-10-CM

## 2021-06-16 PROCEDURE — 19318 BREAST REDUCTION: CPT | Mod: AS,50

## 2021-06-16 PROCEDURE — 88305 TISSUE EXAM BY PATHOLOGIST: CPT | Mod: 26

## 2021-06-16 PROCEDURE — 19318 BREAST REDUCTION: CPT | Mod: 50

## 2021-06-16 RX ORDER — HYDROMORPHONE HYDROCHLORIDE 2 MG/ML
1 INJECTION INTRAMUSCULAR; INTRAVENOUS; SUBCUTANEOUS
Refills: 0 | Status: DISCONTINUED | OUTPATIENT
Start: 2021-06-16 | End: 2021-06-16

## 2021-06-16 RX ORDER — HYDROMORPHONE HYDROCHLORIDE 2 MG/ML
0.5 INJECTION INTRAMUSCULAR; INTRAVENOUS; SUBCUTANEOUS
Refills: 0 | Status: DISCONTINUED | OUTPATIENT
Start: 2021-06-16 | End: 2021-06-16

## 2021-06-16 RX ORDER — CELECOXIB 200 MG/1
1 CAPSULE ORAL
Qty: 0 | Refills: 0 | DISCHARGE

## 2021-06-16 RX ORDER — GABAPENTIN 400 MG/1
0 CAPSULE ORAL
Qty: 0 | Refills: 0 | DISCHARGE

## 2021-06-16 RX ORDER — TRAMADOL HYDROCHLORIDE 50 MG/1
1 TABLET ORAL
Qty: 16 | Refills: 0
Start: 2021-06-16 | End: 2021-06-19

## 2021-06-16 RX ORDER — OXYCODONE AND ACETAMINOPHEN 5; 325 MG/1; MG/1
1 TABLET ORAL ONCE
Refills: 0 | Status: DISCONTINUED | OUTPATIENT
Start: 2021-06-16 | End: 2021-06-16

## 2021-06-16 RX ORDER — CEPHALEXIN 500 MG
1 CAPSULE ORAL
Qty: 20 | Refills: 0
Start: 2021-06-16 | End: 2021-06-20

## 2021-06-16 RX ORDER — ONDANSETRON 8 MG/1
4 TABLET, FILM COATED ORAL ONCE
Refills: 0 | Status: DISCONTINUED | OUTPATIENT
Start: 2021-06-16 | End: 2021-06-30

## 2021-06-16 RX ORDER — SODIUM CHLORIDE 9 MG/ML
1000 INJECTION, SOLUTION INTRAVENOUS
Refills: 0 | Status: DISCONTINUED | OUTPATIENT
Start: 2021-06-16 | End: 2021-06-30

## 2021-06-16 RX ADMIN — OXYCODONE AND ACETAMINOPHEN 1 TABLET(S): 5; 325 TABLET ORAL at 16:12

## 2021-06-16 RX ADMIN — HYDROMORPHONE HYDROCHLORIDE 0.5 MILLIGRAM(S): 2 INJECTION INTRAMUSCULAR; INTRAVENOUS; SUBCUTANEOUS at 15:25

## 2021-06-16 RX ADMIN — SODIUM CHLORIDE 100 MILLILITER(S): 9 INJECTION, SOLUTION INTRAVENOUS at 14:50

## 2021-06-16 RX ADMIN — HYDROMORPHONE HYDROCHLORIDE 0.5 MILLIGRAM(S): 2 INJECTION INTRAMUSCULAR; INTRAVENOUS; SUBCUTANEOUS at 14:57

## 2021-06-16 NOTE — CHART NOTE - NSCHARTNOTEFT_GEN_A_CORE
PACU ANESTHESIA ADMISSION NOTE      Procedure: Breast reduction, bilateral      Post op diagnosis:  Macromastia        ____  Intubated  TV:______       Rate: ______      FiO2: ______    __x__  Patent Airway    __x__  Full return of protective reflexes    __x__  Full recovery from anesthesia / back to baseline status    Vitals:  T(C): 36.6 (06-16-21 @ 10:29), Max: 36.6 (06-16-21 @ 09:34)  HR: 62 (06-16-21 @ 10:29) (62 - 62)  BP: 99/59 (06-16-21 @ 10:29) (99/59 - 99/59)  RR: 18 (06-16-21 @ 10:29) (18 - 18)  SpO2: 100% (06-16-21 @ 10:29) (100% - 100%)    Mental Status:  __x__ Awake   ___x__ Alert   _____ Drowsy   _____ Sedated    Nausea/Vomiting:  __x__ NO  ______Yes,   See Post - Op Orders          Pain Scale (0-10):  _____    Treatment: ____ None    __x__ See Post - Op/PCA Orders    Post - Operative Fluids:   ____ Oral   __x__ See Post - Op Orders    Plan: Discharge:   _X___Home       _____Floor     _____Critical Care    _____  Other:_________________    Comments: Patient had smooth intraoperative event, no anesthesia complication.  PACU Vital signs: HR:  84         BP:    117    /  71        RR: 18            O2 Sat:  100     %     Temp 98f

## 2021-06-16 NOTE — ASU DISCHARGE PLAN (ADULT/PEDIATRIC) - ASU DC SPECIAL INSTRUCTIONSFT
Keep surgical site clean and dry.   Do not remove any dressings or get area wet.   Wear surgical bra at all times until seen by MD.  Rest, no lifting.   Continue Gabapentin/Celebrex twice daily starting tonight for 3 days.   Take extra strength Tylenol every 6 hours.

## 2021-06-16 NOTE — ASU DISCHARGE PLAN (ADULT/PEDIATRIC) - CARE PROVIDER_API CALL
Efraín Mcginnis)  Plastic Surgery; Surgery of the Hand  [address] [place] [address], [address]  [place] [address]  Phone: [phone]  Fax: [phone]  Follow Up Time:

## 2021-06-17 ENCOUNTER — APPOINTMENT (OUTPATIENT)
Dept: PLASTIC SURGERY | Facility: CLINIC | Age: 31
End: 2021-06-17
Payer: COMMERCIAL

## 2021-06-17 ENCOUNTER — EMERGENCY (EMERGENCY)
Facility: HOSPITAL | Age: 31
LOS: 0 days | Discharge: HOME | End: 2021-06-17
Attending: STUDENT IN AN ORGANIZED HEALTH CARE EDUCATION/TRAINING PROGRAM | Admitting: STUDENT IN AN ORGANIZED HEALTH CARE EDUCATION/TRAINING PROGRAM
Payer: COMMERCIAL

## 2021-06-17 VITALS
HEART RATE: 101 BPM | HEIGHT: 64 IN | RESPIRATION RATE: 20 BRPM | OXYGEN SATURATION: 98 % | WEIGHT: 130.07 LBS | TEMPERATURE: 96 F | DIASTOLIC BLOOD PRESSURE: 66 MMHG | SYSTOLIC BLOOD PRESSURE: 118 MMHG

## 2021-06-17 VITALS
HEART RATE: 77 BPM | TEMPERATURE: 98 F | RESPIRATION RATE: 19 BRPM | DIASTOLIC BLOOD PRESSURE: 60 MMHG | SYSTOLIC BLOOD PRESSURE: 109 MMHG

## 2021-06-17 DIAGNOSIS — Z79.899 OTHER LONG TERM (CURRENT) DRUG THERAPY: ICD-10-CM

## 2021-06-17 DIAGNOSIS — Z98.86 PERSONAL HISTORY OF BREAST IMPLANT REMOVAL: ICD-10-CM

## 2021-06-17 DIAGNOSIS — N61.1 ABSCESS OF THE BREAST AND NIPPLE: Chronic | ICD-10-CM

## 2021-06-17 DIAGNOSIS — N64.89 OTHER SPECIFIED DISORDERS OF BREAST: ICD-10-CM

## 2021-06-17 DIAGNOSIS — N64.4 MASTODYNIA: ICD-10-CM

## 2021-06-17 DIAGNOSIS — N63.0 UNSPECIFIED LUMP IN UNSPECIFIED BREAST: ICD-10-CM

## 2021-06-17 LAB
ALBUMIN SERPL ELPH-MCNC: 3.9 G/DL — SIGNIFICANT CHANGE UP (ref 3.5–5.2)
ALP SERPL-CCNC: 50 U/L — SIGNIFICANT CHANGE UP (ref 30–115)
ALT FLD-CCNC: 13 U/L — SIGNIFICANT CHANGE UP (ref 0–41)
ANION GAP SERPL CALC-SCNC: 7 MMOL/L — SIGNIFICANT CHANGE UP (ref 7–14)
AST SERPL-CCNC: 15 U/L — SIGNIFICANT CHANGE UP (ref 0–41)
BASOPHILS # BLD AUTO: 0.03 K/UL — SIGNIFICANT CHANGE UP (ref 0–0.2)
BASOPHILS NFR BLD AUTO: 0.2 % — SIGNIFICANT CHANGE UP (ref 0–1)
BILIRUB SERPL-MCNC: 1.1 MG/DL — SIGNIFICANT CHANGE UP (ref 0.2–1.2)
BUN SERPL-MCNC: 18 MG/DL — SIGNIFICANT CHANGE UP (ref 10–20)
CALCIUM SERPL-MCNC: 9.3 MG/DL — SIGNIFICANT CHANGE UP (ref 8.5–10.1)
CHLORIDE SERPL-SCNC: 98 MMOL/L — SIGNIFICANT CHANGE UP (ref 98–110)
CO2 SERPL-SCNC: 28 MMOL/L — SIGNIFICANT CHANGE UP (ref 17–32)
CREAT SERPL-MCNC: 0.7 MG/DL — SIGNIFICANT CHANGE UP (ref 0.7–1.5)
EOSINOPHIL # BLD AUTO: 0.01 K/UL — SIGNIFICANT CHANGE UP (ref 0–0.7)
EOSINOPHIL NFR BLD AUTO: 0.1 % — SIGNIFICANT CHANGE UP (ref 0–8)
GLUCOSE SERPL-MCNC: 109 MG/DL — HIGH (ref 70–99)
HCG SERPL QL: NEGATIVE — SIGNIFICANT CHANGE UP
HCT VFR BLD CALC: 30.4 % — LOW (ref 37–47)
HGB BLD-MCNC: 10.5 G/DL — LOW (ref 12–16)
IMM GRANULOCYTES NFR BLD AUTO: 0.4 % — HIGH (ref 0.1–0.3)
LYMPHOCYTES # BLD AUTO: 1.48 K/UL — SIGNIFICANT CHANGE UP (ref 1.2–3.4)
LYMPHOCYTES # BLD AUTO: 10 % — LOW (ref 20.5–51.1)
MCHC RBC-ENTMCNC: 31.8 PG — HIGH (ref 27–31)
MCHC RBC-ENTMCNC: 34.5 G/DL — SIGNIFICANT CHANGE UP (ref 32–37)
MCV RBC AUTO: 92.1 FL — SIGNIFICANT CHANGE UP (ref 81–99)
MONOCYTES # BLD AUTO: 1.19 K/UL — HIGH (ref 0.1–0.6)
MONOCYTES NFR BLD AUTO: 8 % — SIGNIFICANT CHANGE UP (ref 1.7–9.3)
NEUTROPHILS # BLD AUTO: 12.06 K/UL — HIGH (ref 1.4–6.5)
NEUTROPHILS NFR BLD AUTO: 81.3 % — HIGH (ref 42.2–75.2)
NRBC # BLD: 0 /100 WBCS — SIGNIFICANT CHANGE UP (ref 0–0)
PLATELET # BLD AUTO: 213 K/UL — SIGNIFICANT CHANGE UP (ref 130–400)
POTASSIUM SERPL-MCNC: 3.9 MMOL/L — SIGNIFICANT CHANGE UP (ref 3.5–5)
POTASSIUM SERPL-SCNC: 3.9 MMOL/L — SIGNIFICANT CHANGE UP (ref 3.5–5)
PROT SERPL-MCNC: 6.2 G/DL — SIGNIFICANT CHANGE UP (ref 6–8)
RBC # BLD: 3.3 M/UL — LOW (ref 4.2–5.4)
RBC # FLD: 12 % — SIGNIFICANT CHANGE UP (ref 11.5–14.5)
SODIUM SERPL-SCNC: 133 MMOL/L — LOW (ref 135–146)
WBC # BLD: 14.83 K/UL — HIGH (ref 4.8–10.8)
WBC # FLD AUTO: 14.83 K/UL — HIGH (ref 4.8–10.8)

## 2021-06-17 PROCEDURE — 10030 IMG GID FLU COLL DRG SFT TIS: CPT

## 2021-06-17 PROCEDURE — 76642 ULTRASOUND BREAST LIMITED: CPT | Mod: 26,50

## 2021-06-17 PROCEDURE — 99285 EMERGENCY DEPT VISIT HI MDM: CPT

## 2021-06-17 PROCEDURE — 99024 POSTOP FOLLOW-UP VISIT: CPT

## 2021-06-17 RX ORDER — MORPHINE SULFATE 50 MG/1
4 CAPSULE, EXTENDED RELEASE ORAL ONCE
Refills: 0 | Status: DISCONTINUED | OUTPATIENT
Start: 2021-06-17 | End: 2021-06-17

## 2021-06-17 RX ADMIN — MORPHINE SULFATE 4 MILLIGRAM(S): 50 CAPSULE, EXTENDED RELEASE ORAL at 09:49

## 2021-06-17 NOTE — ED PROVIDER NOTE - PROGRESS NOTE DETAILS
ATTENDING NOTE:   29 y/o F with no PMH had a breast reduction and lift with Dr. Mcginnis yesterday and reports last night she felt like something popped. She went to the office today and was sent in for imaging. Pt c/o pain and swelling to her breast. No f/c, cough, n/v.  CONSTITUTIONAL: WA / WN / NAD. HEAD: NCAT. EYES: PERRL; EOMI; anicteric. ENT: Normal pharynx; mucous membranes pink/moist, no erythema. NECK: Supple; no meningeal signs CARD: RRR; nl S1/S2; no M/R/G. Pulses equal bilaterally. RESP: Respiratory rate and effort are normal; breath sounds clear and equal bilaterally. Breast exam chaperoned by Nurse Leeanne Brandon, (+) ecchymosis underneath /l breasts with swelling, no active drainage, steri strips in place ABD: Soft, NT ND nl bowel sounds; no masses; no rebound. MSK/EXT: No gross deformities; full range of motion. SKIN: Warm and dry;  NEURO: AAOx3, PSYCH: Memory Intact, Normal Affect. Spoke with Dr. Steve from radiology, aware pt is here request breast US which he will have the tech come and perform. SR: spoke with Dr. shannon, took patient to IR for drains, will confirm with dr. albarran if she can be discharged afterwards. SR: spoke with dr. shannon who states he placed drains, spoke with dr. albarran as patient will be following up outpaiten.t CONSTITUTIONAL: WA / WN / NAD. HEAD: NCAT. EYES: PERRL; EOMI; anicteric. ENT: Normal pharynx; mucous membranes pink/moist, no erythema. NECK: Supple; no meningeal signs CARD: RRR; nl S1/S2; no M/R/G. Pulses equal bilaterally. RESP: Respiratory rate and effort are normal; breath sounds clear and equal bilaterally. Breast exam chaperoned by Nurse Leeanne Brandon, (+) ecchymosis underneath /l breasts with swelling, no active drainage, steri strips in place ABD: Soft, NT ND nl bowel sounds; no masses; no rebound. MSK/EXT: No gross deformities; full range of motion. SKIN: Warm and dry;  NEURO: AAOx3, PSYCH: Memory Intact, Normal Affect. SR: Spoke with Dr. Mcginnis who states pt is ok to be DC’d with drains in place. Pt to f/u with Dr. Mcginnis in one week.

## 2021-06-17 NOTE — ED PROVIDER NOTE - OBJECTIVE STATEMENT
29 y/o F with no PMH had a breast reduction and lift with Dr. Mcginnis yesterday and reports last night she felt like something popped. She went to the office today and was sent in for imaging. Pt c/o pain and swelling to her breast. No f/c, cough, n/v.

## 2021-06-17 NOTE — ED PROVIDER NOTE - PATIENT PORTAL LINK FT
You can access the FollowMyHealth Patient Portal offered by [facility] by registering at the following website: [email or web address]. By joining [email or web address]’s FollowMyHealth portal, you will also be able to view your health information using other applications (apps) compatible with our system.

## 2021-06-17 NOTE — ED PROVIDER NOTE - CARE PROVIDER_API CALL
Qasim Mcginnis  [id number]  [address]  [place] [address]  Phone: [phone]  Fax: ([phone]  Follow Up Time: 4-6 Days   Efraín Mcginnis)  Plastic Surgery; Surgery of the Hand  [address] [place] [address], [address]  [place] [address]  Phone: [phone]  Fax: [phone]  Follow Up Time: 4-6 Days

## 2021-06-17 NOTE — PROCEDURE NOTE - PROCEDURE FINDINGS AND DETAILS
Successful US guided aspiration and placement of bilateral breast hematoma 12 Kiswahili drainage catheters. Attached to KITTY drains.     Will follow up with Dr. Mcginnis in the office.

## 2021-06-17 NOTE — ED PROVIDER NOTE - CLINICAL SUMMARY MEDICAL DECISION MAKING FREE TEXT BOX
31 y/o F with no PMH had a breast reduction and lift with Dr. Mcginnis yesterday and reports last night she felt like something popped. She went to the office today and was sent in for imaging.  Vs reviewed. labs imaging obtained. patient went to IR had b/l drains placed. Patient to follow up with Dr. Mcginnis in 1 week Patient a spoken to in detail about results  All questions addressed.  Results of ED work up discussed and patient given a copy of the results. Patient has proper follow up. Return precautions given.

## 2021-06-17 NOTE — ASSESSMENT
[FreeTextEntry1] : Pt had bilateral breast reduction yesterday in ambulatory surgery\par called me last night few times w breast pain and sent me a pictrue\par at that time said "they just got swollen"\par advised to come into office this am at 8\par \par saww/examined opt w her boyfriend (), Melvi and Nafisa\par Pt has significant bilateral breast hematoma w slight venous congestion of right NAC\par \par I discussed this was quite an unusual finding--bilateral breast hematoma day after breast reduction--and made further inquiries into medication usage and possible undiscovered bleeding disorder\par \par I asked if they had followed postop instruction and avoided sexual activity--something that is emphasized to all patients.\par \par In any case they admitted to having sex last night with her on her back.\par \par This is obviously the reason she has bilateral breast hematomas.  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  It needed to be said and we moved on.\par \par She needs decompression.  I discussed this w Dr Steve.  Plan for VIR drainage catheter placement today under U/S guidance.  Explained this may lead to her having a less than desirable outcome, possible surgery, dissatisfaction w outcome\par \par They understand \par \par They went directly to ER.  I have discussed this w ER attending as well.

## 2021-06-17 NOTE — ED ADULT TRIAGE NOTE - CHIEF COMPLAINT QUOTE
Patient states had a breast reduction and lift yesterday. Patient states she did strenuous activity yesterday and may have popped something. Patient told to come for admission for possible drains.

## 2021-06-17 NOTE — ED PROVIDER NOTE - PROVIDER TOKENS
PROVIDER:[TOKEN:[[id number]:[id number]],FOLLOWUP:[4-6 Days]] PROVIDER:[TOKEN:[[id number]],FOLLOWUP:[4-6 Days]]

## 2021-06-17 NOTE — ED PROVIDER NOTE - CARE PROVIDERS DIRECT ADDRESSES
[email or web address]

## 2021-06-17 NOTE — ED PROVIDER NOTE - NS ED ROS FT
Constitutional: See HPI.  Cardiac: No SOB or edema. No chest pain with exertion. (+) Breast pain and swelling, see HPI.  Respiratory: No cough or respiratory distress. No hemoptysis. No history of asthma or RAD.  GI: No nausea, vomiting, diarrhea or abdominal pain.  MS: No myalgia, muscle weakness, joint pain or back pain.  Neuro: No headache or weakness. No LOC.  Skin: No skin rash.

## 2021-06-17 NOTE — ED PROVIDER NOTE - PHYSICAL EXAMINATION
CONSTITUTIONAL: WA / WN / NAD.   HEAD: NCAT.   NECK: Supple; no meningeal signs CARD:   RRR; nl S1/S2; no M/R/G. Pulses equal bilaterally.   RESP: Respiratory rate and effort are normal; breath sounds clear and equal bilaterally. Breast exam chaperoned by Nurse Leeanne Brandon, (+) ecchymosis underneath /l breasts with swelling, no active drainage, steri strips in place   ABD: Soft, NT ND nl bowel sounds; no masses; no rebound.   MSK/EXT: No gross deformities; full range of motion.   SKIN: Warm and dry;    NEURO: AAOx3,   PSYCH: Memory Intact, Normal Affect.

## 2021-06-18 LAB — SURGICAL PATHOLOGY STUDY: SIGNIFICANT CHANGE UP

## 2021-06-23 ENCOUNTER — APPOINTMENT (OUTPATIENT)
Dept: PLASTIC SURGERY | Facility: CLINIC | Age: 31
End: 2021-06-23
Payer: COMMERCIAL

## 2021-06-23 DIAGNOSIS — N62 HYPERTROPHY OF BREAST: ICD-10-CM

## 2021-06-23 DIAGNOSIS — N64.4 MASTODYNIA: ICD-10-CM

## 2021-06-23 DIAGNOSIS — M54.9 DORSALGIA, UNSPECIFIED: ICD-10-CM

## 2021-06-23 DIAGNOSIS — N64.81 PTOSIS OF BREAST: ICD-10-CM

## 2021-06-23 DIAGNOSIS — T14.8XXA OTHER INJURY OF UNSPECIFIED BODY REGION, INITIAL ENCOUNTER: ICD-10-CM

## 2021-06-23 PROCEDURE — 99024 POSTOP FOLLOW-UP VISIT: CPT

## 2021-06-23 RX ORDER — CEPHALEXIN 500 MG/1
500 CAPSULE ORAL 4 TIMES DAILY
Qty: 20 | Refills: 0 | Status: ACTIVE | COMMUNITY
Start: 2021-06-23 | End: 1900-01-01

## 2021-06-23 RX ORDER — BACITRACIN 500 [IU]/G
500 OINTMENT TOPICAL TWICE DAILY
Qty: 1 | Refills: 0 | Status: ACTIVE | COMMUNITY
Start: 2021-06-23 | End: 1900-01-01

## 2021-06-23 NOTE — ASSESSMENT
[FreeTextEntry1] : 29 yo F with symptomatic macromastia now POD#7 s/p BBR with development of b/l hematoma after sexual activity s/p IR drain placement, improving. \par \par - all dressings changed\par - continue IR drains \par - Bacitracin to blisters\par - will renew Keflex prophylactically \par - discussed again post-op instructions, specifically refraining from sexual activity. Both pt and boyfriend asking again when it's ok to resume sex, with specific inquiries about positions. Explained the risks of noncompliance again, they seem to understand. \par - f/u next week with Dr. Mcginnis. \par

## 2021-06-23 NOTE — DATA REVIEWED
[FreeTextEntry1] : \par  Pathology             Final\par \par No Documents Attached\par \par \par \par \par   [facility] Accession Number : [id number]\par \par SUNSHINE HARDWICK                     2\par \par \par \par Surgical Final Report\par \par \par \par \par Final Diagnosis\par 1.  Breast, right tissue and skin, reduction:\par -  Benign fibrofatty breast tissue with overlying periareolar\par skin, both without histopathologic abnormality; 218 grams.\par \par 2   Breast, left tissue and skin, reduction:\par -  Benign fibrofatty breast tissue with overlying periareolar\par skin, both\par without histopathologic abnormality; 204 grams.\par \par Verified by: Gunner Chaudhary M.D.\par (Electronic Signature)\par Reported on: 06/18/21 15:58 EDT, [facility] [address]\par Phone: [phone]   Fax: [phone]\par _________________________________________________________________\par \par Clinical History\par Bilateral breast reduction\par COVID (-)\par \par Specimen(s) Submitted\par 1     Right breast reduction specimen\par Time obtained: 1:43 pm\par 2     Left breast reduction specimen\par Time obtained: 1:45 pm\par \par Gross Description\par 1.  The specimen is received fresh, labeled "right breast\par reduction specimen" and consists of multiple irregular\par fibroadipose tissue with skin attached measuring 14.5 x 11 x 2.5\par cm and weighing 218 gm.  The specimen is serially sectioned and\par cut surface shows white yellow fibrous with white firm nodule.\par Representative sections are submitted. (3 blocks)\par \par 2.  The specimen is received fresh, labeled "left breast\par reduction specimen" and consists of multiple fibroadipose tissue\par measuring 12 x 10.5 x 3 cm in aggregate and weighing\par 204 gm.  The specimen is serially sectioned and cut surface shows\par white yellow fibrous with white firm nodule. Representative\par sections are submitted. (3 blocks)\par \par Specimen was received and underwent gross examination at [facility], [address] [place] [address],[place] [address].\par \par 06/16/2021 16:35:17 EDT bc\par \par \par \par \par \par \par SUNSHINE HARDWICK                     2\par \par \par \par Surgical Final Report\par \par \par \par \par Perioperative Diagnosis\par Macromastia\par \par  \par \par  Ordered by: RAN BELTRAN IV       Collected/Examined: 16Jun2021 01:43PM       \par Verification Required       Stage: Final       \par  Performed at: [facility]       Resulted: 18Jun2021 03:58PM       Last Updated: 18Jun2021 03:59PM       Accession: [id number]

## 2021-06-23 NOTE — PHYSICAL EXAM
[de-identified] : good inspiratory effort  [de-identified] : well developed pleasant female, NAD [de-identified] : Bilateral breasts softer with diffuse bruising and swelling, scattered skin blisters b/l, incisions c/d/i with intact nipple sensation, drains functional with sanguinous output

## 2021-06-29 ENCOUNTER — APPOINTMENT (OUTPATIENT)
Dept: PLASTIC SURGERY | Facility: CLINIC | Age: 31
End: 2021-06-29
Payer: COMMERCIAL

## 2021-06-29 PROCEDURE — 99024 POSTOP FOLLOW-UP VISIT: CPT

## 2021-06-29 NOTE — ASSESSMENT
[FreeTextEntry1] : pt bilateral breast swelling/ecchymosis much improved with drains in place\par softer\par still, however, with central bruising and firmess within breast parenchyma\par \par pain well controlled\par \par plan to return to offie this Friday for likely removal of drains\par \par discussed again how the development of the bilateral breast hematomas will very likely compromise her results to a certain degree\par \par Due to COVID 19, pre-visit patient instructions were explained to the patient and their symptoms were checked upon arrival.  \par Masks were used by the health care providers and staff and the examination room was cleaned after the patient visit was completed.\par

## 2021-07-02 ENCOUNTER — APPOINTMENT (OUTPATIENT)
Dept: PLASTIC SURGERY | Facility: CLINIC | Age: 31
End: 2021-07-02
Payer: COMMERCIAL

## 2021-07-02 PROCEDURE — 99024 POSTOP FOLLOW-UP VISIT: CPT

## 2021-07-02 NOTE — PHYSICAL EXAM
[de-identified] : well developed pleasant female, NAD [de-identified] : good inspiratory effort  [de-identified] : Bilateral breasts softer with diffuse bruising and swelling, scattered skin blisters b/l, incisions c/d/i with intact nipple sensation, drains functional with dark sanguinous output

## 2021-07-02 NOTE — DATA REVIEWED
[FreeTextEntry1] : \par  Pathology             Final\par \par No Documents Attached\par \par \par \par \par   [facility] Accession Number : [id number]\par \par SUNSHINE HARDWICK                     2\par \par \par \par Surgical Final Report\par \par \par \par \par Final Diagnosis\par 1.  Breast, right tissue and skin, reduction:\par -  Benign fibrofatty breast tissue with overlying periareolar\par skin, both without histopathologic abnormality; 218 grams.\par \par 2   Breast, left tissue and skin, reduction:\par -  Benign fibrofatty breast tissue with overlying periareolar\par skin, both\par without histopathologic abnormality; 204 grams.\par \par Verified by: Gunner Chaudhary M.D.\par (Electronic Signature)\par Reported on: 06/18/21 15:58 EDT, [address] [facility] [address]\par Phone: [phone]   Fax: [phone]\par _________________________________________________________________\par \par Clinical History\par Bilateral breast reduction\par COVID (-)\par \par Specimen(s) Submitted\par 1     Right breast reduction specimen\par Time obtained: 1:43 pm\par 2     Left breast reduction specimen\par Time obtained: 1:45 pm\par \par Gross Description\par 1.  The specimen is received fresh, labeled "right breast\par reduction specimen" and consists of multiple irregular\par fibroadipose tissue with skin attached measuring 14.5 x 11 x 2.5\par cm and weighing 218 gm.  The specimen is serially sectioned and\par cut surface shows white yellow fibrous with white firm nodule.\par Representative sections are submitted. (3 blocks)\par \par 2.  The specimen is received fresh, labeled "left breast\par reduction specimen" and consists of multiple fibroadipose tissue\par measuring 12 x 10.5 x 3 cm in aggregate and weighing\par 204 gm.  The specimen is serially sectioned and cut surface shows\par white yellow fibrous with white firm nodule. Representative\par sections are submitted. (3 blocks)\par \par Specimen was received and underwent gross examination at [facility], [address] [place] [address],[place] [address].\par \par 06/16/2021 16:35:17 EDT bc\par \par \par \par \par \par \par SUNSHINE HARDWICK                     2\par \par \par \par Surgical Final Report\par \par \par \par \par Perioperative Diagnosis\par Macromastia\par \par  \par \par  Ordered by: RAN BELTRAN IV       Collected/Examined: 16Jun2021 01:43PM       \par Verification Required       Stage: Final       \par  Performed at: [facility]       Resulted: 18Jun2021 03:58PM       Last Updated: 18Jun2021 03:59PM       Accession: [id number]

## 2021-07-02 NOTE — HISTORY OF PRESENT ILLNESS
[FreeTextEntry1] : 31 yo F with PMHx of ADD who presents today for breast reduction consultation. Patient wears a 32DDD cup size and c/o constant neck, shoulder and back pain, brassiere grooving, poor posture, frequent skin rash in IMFs treated multiple times in the past with OTC remedies. Patient is unable to exercise and be physically active secondary to heavy chest size and has trouble finding clothes that fit properly. 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 Denies any personal or family h/o Breast Ca. \par Denies any nipple discharge or breast nodules. Desires a C-cup. 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 Pt presents today POD#1 s/p BBR c/o significant bilateral breast swelling which started after sexual activity last night with her boyfriend despite clear post-op instructions to refrain from  ALL forms of physical activity including intercourse. \par \par Interval hx (6/23/21). Pt presents today POD#7 s/p BBR complicated by development of b/l hematomas secondary to sexual activity the day of surgery. Now s/p IR drain placement. Reports feeling better with improving pain and swelling. Denies any fever or chills. Completed oral antibiotics. Drains approximately 20-30 cc per day. \par Pt admits to returning to work yesterday. \par \par Interval hx (7/2/21): Pt presents today POD #16 s/p BBR complicated by development of b/l hematomas secondary to sexual activity the day of surgery. Now s/p IR drain placement. Drain output 0-20cc per day for the last 3 days. Feels breasts are softening. Drain output becoming darker.

## 2021-07-02 NOTE — ASSESSMENT
[FreeTextEntry1] : 29 yo F with symptomatic macromastia now POD#16 s/p BBR with development of b/l hematoma after sexual activity s/p IR drain placement, improving. \par \par - drains removed\par - may shower tomorrow\par - daily aquaphor\par - all sutures removed except to superior vertical incisions\par - reviewed activity restrictions, no sexual activity or strenuous activity/gym until cleared\par - continue supportive bra\par - f/u 2 weeks\par \par Due to COVID-19, pre-visit patient instructions were explained to the patient and their symptoms were checked upon arrival. Masks were used by the healthcare provider and staff and the examination room was cleaned after the patient visit concluded\par \par

## 2021-07-13 ENCOUNTER — APPOINTMENT (OUTPATIENT)
Dept: PLASTIC SURGERY | Facility: CLINIC | Age: 31
End: 2021-07-13
Payer: COMMERCIAL

## 2021-07-13 PROCEDURE — 99024 POSTOP FOLLOW-UP VISIT: CPT

## 2021-07-13 NOTE — DATA REVIEWED
[FreeTextEntry1] : \par  Pathology             Final\par \par No Documents Attached\par \par \par \par \par   [facility] Accession Number : [id number]\par \par SUNSHINE HARDWICK                     2\par \par \par \par Surgical Final Report\par \par \par \par \par Final Diagnosis\par 1.  Breast, right tissue and skin, reduction:\par -  Benign fibrofatty breast tissue with overlying periareolar\par skin, both without histopathologic abnormality; 218 grams.\par \par 2   Breast, left tissue and skin, reduction:\par -  Benign fibrofatty breast tissue with overlying periareolar\par skin, both\par without histopathologic abnormality; 204 grams.\par \par Verified by: Gunner Chaudhary M.D.\par (Electronic Signature)\par Reported on: 06/18/21 15:58 EDT, [address] [facility] [place] [address]\par Phone: [phone]   Fax: [phone]\par _________________________________________________________________\par \par Clinical History\par Bilateral breast reduction\par COVID (-)\par \par Specimen(s) Submitted\par 1     Right breast reduction specimen\par Time obtained: 1:43 pm\par 2     Left breast reduction specimen\par Time obtained: 1:45 pm\par \par Gross Description\par 1.  The specimen is received fresh, labeled "right breast\par reduction specimen" and consists of multiple irregular\par fibroadipose tissue with skin attached measuring 14.5 x 11 x 2.5\par cm and weighing 218 gm.  The specimen is serially sectioned and\par cut surface shows white yellow fibrous with white firm nodule.\par Representative sections are submitted. (3 blocks)\par \par 2.  The specimen is received fresh, labeled "left breast\par reduction specimen" and consists of multiple fibroadipose tissue\par measuring 12 x 10.5 x 3 cm in aggregate and weighing\par 204 gm.  The specimen is serially sectioned and cut surface shows\par white yellow fibrous with white firm nodule. Representative\par sections are submitted. (3 blocks)\par \par Specimen was received and underwent gross examination at [facility], [address] [place] [address],[place] [address].\par \par 06/16/2021 16:35:17 EDT bc\par \par \par \par \par \par \par SUNSHINE HARDWICK                     2\par \par \par \par Surgical Final Report\par \par \par \par \par Perioperative Diagnosis\par Macromastia\par \par  \par \par  Ordered by: RAN BELTRAN IV       Collected/Examined: 16Jun2021 01:43PM       \par Verification Required       Stage: Final       \par  Performed at: [facility]       Resulted: 18Jun2021 03:58PM       Last Updated: 18Jun2021 03:59PM       Accession: [id number]

## 2021-07-13 NOTE — PHYSICAL EXAM
[de-identified] : well developed pleasant female, NAD [de-identified] : good inspiratory effort  [de-identified] : Bilateral breasts softer with diffuse bruising and swelling, scattered skin blisters b/l, incisions c/d/i with intact nipple sensation, drains functional with dark sanguinous output

## 2021-07-13 NOTE — HISTORY OF PRESENT ILLNESS
[FreeTextEntry1] : 29 yo F with PMHx of ADD who presents today for breast reduction consultation. Patient wears a 32DDD cup size and c/o constant neck, shoulder and back pain, brassiere grooving, poor posture, frequent skin rash in IMFs treated multiple times in the past with OTC remedies. Patient is unable to exercise and be physically active secondary to heavy chest size and has trouble finding clothes that fit properly. 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 Denies any personal or family h/o Breast Ca. \par Denies any nipple discharge or breast nodules. Desires a C-cup. \par \par Of note, s/p right breast debridement and I&D of abscess after piercing\par \par Occupation - RN at [employer]\par Smokes electronic cigarettes daily \par \par Interval hx (6/9/21): Pt presents today for preoperative questions.\par \par Interval hx (6/17/21). Pt presents today POD#1 s/p BBR c/o significant bilateral breast swelling which started after sexual activity last night with her boyfriend despite clear post-op instructions to refrain from  ALL forms of physical activity including intercourse. \par \par Interval hx (6/23/21). Pt presents today POD#7 s/p BBR complicated by development of b/l hematomas secondary to sexual activity the day of surgery. Now s/p IR drain placement. Reports feeling better with improving pain and swelling. Denies any fever or chills. Completed oral antibiotics. Drains approximately 20-30 cc per day. \par Pt admits to returning to work yesterday. \par \par Interval hx (7/2/21): Pt presents today POD #16 s/p BBR complicated by development of b/l hematomas secondary to sexual activity the day of surgery. Now s/p IR drain placement. Drain output 0-20cc per day for the last 3 days. Feels breasts are softening. Drain output becoming darker.
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
## 2021-11-11 ENCOUNTER — APPOINTMENT (OUTPATIENT)
Dept: PLASTIC SURGERY | Facility: CLINIC | Age: 31
End: 2021-11-11

## 2022-01-11 NOTE — ED ADULT NURSE NOTE - PAIN RATING/NUMBER SCALE (0-10): ACTIVITY
Thank you for letting us take care of you today. We hope all your questions were addressed. If a question was overlooked or something else comes to mind after you return home, please contact a member of your Care Team listed below. Your Care Team at [place] [address] is Team #4  Vladimir Greenwood MD (Faculty)  Racquel Khan MD (Resident)  Elvin Yao MD (Resident)  Zia An MD (Resident)  Noel Bryson MD (Resident)  Steve BABCOCK,BRAYAN Valencia., FLORIDA Alexander., Christiano Hernadez., [facility] office)  Duane Tolbert, [address] (Clinical Practice Manager)  Christine Tineo [facility] (Clinical Pharmacist)       Office phone number: [phone]    If you need to get in right away due to illness, please be advised we have \"Same Day\" appointments available Monday-Friday. Please call us at [phone] option #3 to schedule your \"Same Day\" appointment. 0

## 2022-03-04 NOTE — PRE-OP CHECKLIST - VIA
Kayy is a 31 year old year old female here for an OB check.  She is      .   Gestational Age 33w3d.      She reports fetal movement  She denies contractions  She denies bleeding  She denies headaches  She denies edema  She denies abdominal pain, sight left sided pain with pressure  She denies rupture of membranes  She denies vision changes         ambulate

## 2022-06-02 NOTE — ED ADULT NURSE NOTE - CAS EDP DISCH TYPE
06/02/2022  So Winkler is a 45 y.o., female.      Pre-op Assessment    I have reviewed the Patient Summary Reports.     I have reviewed the Nursing Notes. I have reviewed the NPO Status.   I have reviewed the Medications.     Review of Systems  Anesthesia Hx:  Denies Family Hx of Anesthesia complications.   Denies Personal Hx of Anesthesia complications.   Social:  Non-Smoker    Hematology/Oncology:  Hematology Normal      Oncology Comments: R/o breast CA    EENT/Dental:EENT/Dental Normal   Cardiovascular:  Cardiovascular Normal     Pulmonary:  Pulmonary Normal    Renal/:  Renal/ Normal     Hepatic/GI:  Hepatic/GI Normal    Musculoskeletal:  Musculoskeletal Normal    Neurological:  Neurology Normal    Endocrine:   Denies Diabetes. (pre)  Morbid Obesity / BMI > 40  Dermatological:  Skin Normal    Psych:  Psychiatric Normal           Physical Exam  General: Cooperative, Alert and Oriented    Airway:  Mallampati: II   Mouth Opening: Normal  TM Distance: Normal  Neck ROM: Normal ROM    Dental:  Intact, Caps / Implants        Anesthesia Plan  Type of Anesthesia, risks & benefits discussed:    Anesthesia Type: Gen ETT, Gen Supraglottic Airway, Gen Natural Airway  Intra-op Monitoring Plan: Standard ASA Monitors  Post Op Pain Control Plan: multimodal analgesia and IV/PO Opioids PRN  Induction:  IV  Airway Plan: Video  Informed Consent: Informed consent signed with the Patient and all parties understand the risks and agree with anesthesia plan.  All questions answered.   ASA Score: 3    Ready For Surgery From Anesthesia Perspective.     .       Home

## 2022-12-03 ENCOUNTER — NON-APPOINTMENT (OUTPATIENT)
Age: 32
End: 2022-12-03

## 2023-01-15 ENCOUNTER — NON-APPOINTMENT (OUTPATIENT)
Age: 33
End: 2023-01-15

## 2024-02-28 NOTE — ED PROCEDURE NOTE - GENERAL PROCEDURE DETAILS
Obstructive sleep apnea    Obstructive sleep apnea is a potentially serious sleep disorder. It causes breathing to repeatedly stop and start during sleep.    There are several types of sleep apnea, but the most common is obstructive sleep apnea. This type of apnea occurs when your throat muscles intermittently relax and block your airway during sleep. A noticeable sign of obstructive sleep apnea is snoring.    Treatments for obstructive sleep apnea are available. One treatment involves using a device that uses positive pressure to keep your airway open while you sleep. Another option is a mouthpiece to thrust your lower jaw forward during sleep. In some cases, surgery may be an option too.    Untreated LISSET can cause Stroke, CHF, Hypertension, Sleepiness, Headaches, Depression, Diabetes and Weight gain.             
electric cautery trephination

## 2024-03-19 NOTE — ED ADULT NURSE NOTE - TEMPLATE
"Pt presents to triage ambulatory from home with daughter. Report taking her blood pressure at home today and SBP was 190. Pt also reports heart burn, nausea, blurred vision, and jaw \"trembling\" a few days ago. Those have resolved but her left shoulder is still painful and the pain radiates down her arm. EKG in triage.     Triage Assessment (Adult)       Row Name 03/19/24 1127          Triage Assessment    Airway WDL WDL        Respiratory WDL    Respiratory WDL WDL        Skin Circulation/Temperature WDL    Skin Circulation/Temperature WDL WDL        Cardiac WDL    Cardiac WDL X        Peripheral/Neurovascular WDL    Peripheral Neurovascular WDL WDL        Cognitive/Neuro/Behavioral WDL    Cognitive/Neuro/Behavioral WDL WDL                     "
General
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
